# Patient Record
Sex: FEMALE | Race: OTHER | HISPANIC OR LATINO | URBAN - METROPOLITAN AREA
[De-identification: names, ages, dates, MRNs, and addresses within clinical notes are randomized per-mention and may not be internally consistent; named-entity substitution may affect disease eponyms.]

---

## 2024-01-06 ENCOUNTER — INPATIENT (INPATIENT)
Facility: HOSPITAL | Age: 29
LOS: 1 days | Discharge: HOME | End: 2024-01-08
Attending: OBSTETRICS & GYNECOLOGY | Admitting: OBSTETRICS & GYNECOLOGY
Payer: MEDICAID

## 2024-01-06 VITALS — SYSTOLIC BLOOD PRESSURE: 124 MMHG | HEART RATE: 78 BPM | DIASTOLIC BLOOD PRESSURE: 77 MMHG

## 2024-01-06 DIAGNOSIS — O26.899 OTHER SPECIFIED PREGNANCY RELATED CONDITIONS, UNSPECIFIED TRIMESTER: ICD-10-CM

## 2024-01-06 DIAGNOSIS — O26.893 OTHER SPECIFIED PREGNANCY RELATED CONDITIONS, THIRD TRIMESTER: ICD-10-CM

## 2024-01-06 LAB
ABO RH CONFIRMATION: SIGNIFICANT CHANGE UP
ABO RH CONFIRMATION: SIGNIFICANT CHANGE UP
BASOPHILS # BLD AUTO: 0.02 K/UL — SIGNIFICANT CHANGE UP (ref 0–0.2)
BASOPHILS # BLD AUTO: 0.02 K/UL — SIGNIFICANT CHANGE UP (ref 0–0.2)
BASOPHILS NFR BLD AUTO: 0.2 % — SIGNIFICANT CHANGE UP (ref 0–2)
BASOPHILS NFR BLD AUTO: 0.2 % — SIGNIFICANT CHANGE UP (ref 0–2)
BLD GP AB SCN SERPL QL: SIGNIFICANT CHANGE UP
BLD GP AB SCN SERPL QL: SIGNIFICANT CHANGE UP
EOSINOPHIL # BLD AUTO: 0.07 K/UL — SIGNIFICANT CHANGE UP (ref 0–0.5)
EOSINOPHIL # BLD AUTO: 0.07 K/UL — SIGNIFICANT CHANGE UP (ref 0–0.5)
EOSINOPHIL NFR BLD AUTO: 0.8 % — SIGNIFICANT CHANGE UP (ref 0–6)
EOSINOPHIL NFR BLD AUTO: 0.8 % — SIGNIFICANT CHANGE UP (ref 0–6)
HCT VFR BLD CALC: 39.8 % — SIGNIFICANT CHANGE UP (ref 34.5–45)
HCT VFR BLD CALC: 39.8 % — SIGNIFICANT CHANGE UP (ref 34.5–45)
HGB BLD-MCNC: 13.5 G/DL — SIGNIFICANT CHANGE UP (ref 11.5–15.5)
HGB BLD-MCNC: 13.5 G/DL — SIGNIFICANT CHANGE UP (ref 11.5–15.5)
HIV 1 & 2 AB SERPL IA.RAPID: SIGNIFICANT CHANGE UP
HIV 1 & 2 AB SERPL IA.RAPID: SIGNIFICANT CHANGE UP
HIV 1+2 AB+HIV1 P24 AG SERPL QL IA: SIGNIFICANT CHANGE UP
HIV 1+2 AB+HIV1 P24 AG SERPL QL IA: SIGNIFICANT CHANGE UP
IMM GRANULOCYTES NFR BLD AUTO: 0.8 % — SIGNIFICANT CHANGE UP (ref 0–0.9)
IMM GRANULOCYTES NFR BLD AUTO: 0.8 % — SIGNIFICANT CHANGE UP (ref 0–0.9)
LYMPHOCYTES # BLD AUTO: 1.7 K/UL — SIGNIFICANT CHANGE UP (ref 1–3.3)
LYMPHOCYTES # BLD AUTO: 1.7 K/UL — SIGNIFICANT CHANGE UP (ref 1–3.3)
LYMPHOCYTES # BLD AUTO: 18.4 % — SIGNIFICANT CHANGE UP (ref 13–44)
LYMPHOCYTES # BLD AUTO: 18.4 % — SIGNIFICANT CHANGE UP (ref 13–44)
MCHC RBC-ENTMCNC: 28.5 PG — SIGNIFICANT CHANGE UP (ref 27–34)
MCHC RBC-ENTMCNC: 28.5 PG — SIGNIFICANT CHANGE UP (ref 27–34)
MCHC RBC-ENTMCNC: 33.9 GM/DL — SIGNIFICANT CHANGE UP (ref 32–36)
MCHC RBC-ENTMCNC: 33.9 GM/DL — SIGNIFICANT CHANGE UP (ref 32–36)
MCV RBC AUTO: 84 FL — SIGNIFICANT CHANGE UP (ref 80–100)
MCV RBC AUTO: 84 FL — SIGNIFICANT CHANGE UP (ref 80–100)
MONOCYTES # BLD AUTO: 0.44 K/UL — SIGNIFICANT CHANGE UP (ref 0–0.9)
MONOCYTES # BLD AUTO: 0.44 K/UL — SIGNIFICANT CHANGE UP (ref 0–0.9)
MONOCYTES NFR BLD AUTO: 4.8 % — SIGNIFICANT CHANGE UP (ref 2–14)
MONOCYTES NFR BLD AUTO: 4.8 % — SIGNIFICANT CHANGE UP (ref 2–14)
NEUTROPHILS # BLD AUTO: 6.96 K/UL — SIGNIFICANT CHANGE UP (ref 1.8–7.4)
NEUTROPHILS # BLD AUTO: 6.96 K/UL — SIGNIFICANT CHANGE UP (ref 1.8–7.4)
NEUTROPHILS NFR BLD AUTO: 75 % — SIGNIFICANT CHANGE UP (ref 43–77)
NEUTROPHILS NFR BLD AUTO: 75 % — SIGNIFICANT CHANGE UP (ref 43–77)
PLATELET # BLD AUTO: 273 K/UL — SIGNIFICANT CHANGE UP (ref 150–400)
PLATELET # BLD AUTO: 273 K/UL — SIGNIFICANT CHANGE UP (ref 150–400)
RBC # BLD: 4.74 M/UL — SIGNIFICANT CHANGE UP (ref 3.8–5.2)
RBC # BLD: 4.74 M/UL — SIGNIFICANT CHANGE UP (ref 3.8–5.2)
RBC # FLD: 14.8 % — HIGH (ref 10.3–14.5)
RBC # FLD: 14.8 % — HIGH (ref 10.3–14.5)
WBC # BLD: 9.26 K/UL — SIGNIFICANT CHANGE UP (ref 3.8–10.5)
WBC # BLD: 9.26 K/UL — SIGNIFICANT CHANGE UP (ref 3.8–10.5)
WBC # FLD AUTO: 9.26 K/UL — SIGNIFICANT CHANGE UP (ref 3.8–10.5)
WBC # FLD AUTO: 9.26 K/UL — SIGNIFICANT CHANGE UP (ref 3.8–10.5)

## 2024-01-06 PROCEDURE — 59409 OBSTETRICAL CARE: CPT | Mod: U9

## 2024-01-06 RX ORDER — CHLORHEXIDINE GLUCONATE 213 G/1000ML
1 SOLUTION TOPICAL DAILY
Refills: 0 | Status: DISCONTINUED | OUTPATIENT
Start: 2024-01-06 | End: 2024-01-06

## 2024-01-06 RX ORDER — HYDROCORTISONE 1 %
1 OINTMENT (GRAM) TOPICAL EVERY 6 HOURS
Refills: 0 | Status: DISCONTINUED | OUTPATIENT
Start: 2024-01-06 | End: 2024-01-08

## 2024-01-06 RX ORDER — CITRIC ACID/SODIUM CITRATE 300-500 MG
30 SOLUTION, ORAL ORAL ONCE
Refills: 0 | Status: DISCONTINUED | OUTPATIENT
Start: 2024-01-06 | End: 2024-01-06

## 2024-01-06 RX ORDER — OXYTOCIN 10 UNIT/ML
333.33 VIAL (ML) INJECTION
Qty: 20 | Refills: 0 | Status: DISCONTINUED | OUTPATIENT
Start: 2024-01-06 | End: 2024-01-06

## 2024-01-06 RX ORDER — ACETAMINOPHEN 500 MG
3 TABLET ORAL
Qty: 60 | Refills: 0
Start: 2024-01-06 | End: 2024-01-10

## 2024-01-06 RX ORDER — DIBUCAINE 1 %
1 OINTMENT (GRAM) RECTAL EVERY 6 HOURS
Refills: 0 | Status: DISCONTINUED | OUTPATIENT
Start: 2024-01-06 | End: 2024-01-08

## 2024-01-06 RX ORDER — SODIUM CHLORIDE 9 MG/ML
3 INJECTION INTRAMUSCULAR; INTRAVENOUS; SUBCUTANEOUS EVERY 8 HOURS
Refills: 0 | Status: DISCONTINUED | OUTPATIENT
Start: 2024-01-06 | End: 2024-01-08

## 2024-01-06 RX ORDER — AER TRAVELER 0.5 G/1
1 SOLUTION RECTAL; TOPICAL EVERY 4 HOURS
Refills: 0 | Status: DISCONTINUED | OUTPATIENT
Start: 2024-01-06 | End: 2024-01-08

## 2024-01-06 RX ORDER — SODIUM CHLORIDE 9 MG/ML
1000 INJECTION, SOLUTION INTRAVENOUS
Refills: 0 | Status: DISCONTINUED | OUTPATIENT
Start: 2024-01-06 | End: 2024-01-06

## 2024-01-06 RX ORDER — OXYCODONE HYDROCHLORIDE 5 MG/1
5 TABLET ORAL EVERY 6 HOURS
Refills: 0 | Status: DISCONTINUED | OUTPATIENT
Start: 2024-01-06 | End: 2024-01-08

## 2024-01-06 RX ORDER — BENZOCAINE 10 %
1 GEL (GRAM) MUCOUS MEMBRANE EVERY 6 HOURS
Refills: 0 | Status: DISCONTINUED | OUTPATIENT
Start: 2024-01-06 | End: 2024-01-08

## 2024-01-06 RX ORDER — SIMETHICONE 80 MG/1
80 TABLET, CHEWABLE ORAL EVERY 4 HOURS
Refills: 0 | Status: DISCONTINUED | OUTPATIENT
Start: 2024-01-06 | End: 2024-01-08

## 2024-01-06 RX ORDER — SODIUM CHLORIDE 9 MG/ML
1000 INJECTION, SOLUTION INTRAVENOUS
Refills: 0 | Status: DISCONTINUED | OUTPATIENT
Start: 2024-01-06 | End: 2024-01-08

## 2024-01-06 RX ORDER — DIPHENHYDRAMINE HCL 50 MG
25 CAPSULE ORAL EVERY 6 HOURS
Refills: 0 | Status: DISCONTINUED | OUTPATIENT
Start: 2024-01-06 | End: 2024-01-08

## 2024-01-06 RX ORDER — ACETAMINOPHEN 500 MG
975 TABLET ORAL
Refills: 0 | Status: DISCONTINUED | OUTPATIENT
Start: 2024-01-06 | End: 2024-01-08

## 2024-01-06 RX ORDER — OXYTOCIN 10 UNIT/ML
41.67 VIAL (ML) INJECTION
Qty: 20 | Refills: 0 | Status: DISCONTINUED | OUTPATIENT
Start: 2024-01-06 | End: 2024-01-08

## 2024-01-06 RX ORDER — PRAMOXINE HYDROCHLORIDE 150 MG/15G
1 AEROSOL, FOAM RECTAL EVERY 4 HOURS
Refills: 0 | Status: DISCONTINUED | OUTPATIENT
Start: 2024-01-06 | End: 2024-01-08

## 2024-01-06 RX ORDER — LANOLIN
1 OINTMENT (GRAM) TOPICAL EVERY 6 HOURS
Refills: 0 | Status: DISCONTINUED | OUTPATIENT
Start: 2024-01-06 | End: 2024-01-08

## 2024-01-06 RX ORDER — TETANUS TOXOID, REDUCED DIPHTHERIA TOXOID AND ACELLULAR PERTUSSIS VACCINE, ADSORBED 5; 2.5; 8; 8; 2.5 [IU]/.5ML; [IU]/.5ML; UG/.5ML; UG/.5ML; UG/.5ML
0.5 SUSPENSION INTRAMUSCULAR ONCE
Refills: 0 | Status: DISCONTINUED | OUTPATIENT
Start: 2024-01-06 | End: 2024-01-08

## 2024-01-06 RX ORDER — MAGNESIUM HYDROXIDE 400 MG/1
30 TABLET, CHEWABLE ORAL
Refills: 0 | Status: DISCONTINUED | OUTPATIENT
Start: 2024-01-06 | End: 2024-01-08

## 2024-01-06 RX ADMIN — SODIUM CHLORIDE 125 MILLILITER(S): 9 INJECTION, SOLUTION INTRAVENOUS at 18:47

## 2024-01-06 RX ADMIN — Medication 975 MILLIGRAM(S): at 21:35

## 2024-01-06 RX ADMIN — SODIUM CHLORIDE 125 MILLILITER(S): 9 INJECTION, SOLUTION INTRAVENOUS at 17:20

## 2024-01-06 RX ADMIN — OXYCODONE HYDROCHLORIDE 5 MILLIGRAM(S): 5 TABLET ORAL at 19:44

## 2024-01-06 NOTE — OB RN TRIAGE NOTE - HOW OFTEN DO YOU HAVE A DRINK CONTAINING ALCOHOL?
Quality 226: Preventive Care And Screening: Tobacco Use: Screening And Cessation Intervention: Patient screened for tobacco use and is an ex/non-smoker Quality 130: Documentation Of Current Medications In The Medical Record: Current Medications Documented Detail Level: Detailed Quality 431: Preventive Care And Screening: Unhealthy Alcohol Use - Screening: Patient screened for unhealthy alcohol use using a single question and scores less than 2 times per year Never

## 2024-01-06 NOTE — DISCHARGE NOTE OB - MATERIALS PROVIDED
Vaccinations/[facility]  Screening Program/  Immunization Record/Breastfeeding Log/Bottle Feeding Log/Breastfeeding Mother’s Support Group Information/Guide to Postpartum Care/[facility] Hearing Screen Program/Back To Sleep Handout/Shaken Baby Prevention Handout/Breastfeeding Guide and Packet/Birth Certificate Instructions/Discharge Medication Information for Patients and Families Pocket Guide/MMR Vaccination (VIS Pub Date: 2012) Vaccinations/[facility]  Screening Program/  Immunization Record/Breastfeeding Log/Bottle Feeding Log/Breastfeeding Mother’s Support Group Information/Guide to Postpartum Care/[facility] Hearing Screen Program/Back To Sleep Handout/Shaken Baby Prevention Handout/Breastfeeding Guide and Packet/Birth Certificate Instructions/Discharge Medication Information for Patients and Families Pocket Guide/MMR Vaccination (VIS Pub Date: 2012)

## 2024-01-06 NOTE — OB PROVIDER H&P - PRO PRENATAL LABS ORI SOURCE HBSAG
MRI needed an up to date order for MRI C-Spine.  Pt has an appt on Friday 7/15/22.  This order was from back in May.  The pt initialaly canceled MRI but then decided to go ahead and have it done.  See 5/23/22 OV note. Order updated and referrals states no Pre-Cert is needed.   hard copy, drawn during this pregnancy

## 2024-01-06 NOTE — OB PROVIDER LABOR PROGRESS NOTE - ASSESSMENT
non reducible anterior lip, will continue to labor patient expectantly  Reexam as patient feels constant pressure

## 2024-01-06 NOTE — OB PROVIDER DELIVERY SUMMARY - NSSELHIDDEN_OBGYN_ALL_OB_FT
[NS_DeliveryAttending1_OBGYN_ALL_OB_FT:[id number]=],[NS_DeliveryAssist1_OBGYN_ALL_OB_FT:[email or web address]=],[NS_DeliveryAssist2_OBGYN_ALL_OB_FT:[id number]=] [NS_DeliveryAttending1_OBGYN_ALL_OB_FT:[id number]=],[NS_DeliveryAssist1_OBGYN_ALL_OB_FT:[email or web address]=],[NS_DeliveryAssist2_OBGYN_ALL_OB_FT:[id number]=]

## 2024-01-06 NOTE — OB PROVIDER H&P - PRO PRENATAL LABS ORI SOURCE RUBELLA
----- Message from Natalie Tompkins PA-C sent at 8/30/2018  8:48 PM CDT -----  No diabetes hemoglobin A1c is normal.  Lab results showed low Vitamin D. Advise RX vitamin D 50,000 IU once weekly for 12 weeks. Recheck vitamin D level in 3 months.   After la
An order was placed for Vitamin D to be done in 3 months and prescription for Vitamin D was sent to the patient's pharmacy.
hard copy, drawn during this pregnancy

## 2024-01-06 NOTE — OB RN PATIENT PROFILE - PRESSURE ULCER(S)
Talked to RN  [phone] from Home facility where patient is resides. Gave instructions to send the BP  report for 1 week (AM AND PM ). She is informed that DR. MCCOLLUM increased midodrine to 15 mg Tid.   no

## 2024-01-06 NOTE — OB RN DELIVERY SUMMARY - NSSELHIDDEN_OBGYN_ALL_OB_FT
[NS_DeliveryAttending1_OBGYN_ALL_OB_FT:[id number]=] [NS_DeliveryAttending1_OBGYN_ALL_OB_FT:[id number]=] [NS_DeliveryAttending1_OBGYN_ALL_OB_FT:[id number]=],[NS_DeliveryAssist1_OBGYN_ALL_OB_FT:[email or web address]=],[NS_DeliveryRN_OBGYN_ALL_OB_FT:[id number]=] [NS_DeliveryAttending1_OBGYN_ALL_OB_FT:[id number]=],[NS_DeliveryAssist1_OBGYN_ALL_OB_FT:[email or web address]=],[NS_DeliveryRN_OBGYN_ALL_OB_FT:[id number]=]

## 2024-01-06 NOTE — OB PROVIDER H&P - ASSESSMENT
A/P: 28y  at 39w6d GA by LMP who presents to L&D for labor at term, contractions every 4-5 minutes. patient receives care in [place], will keep her for delivery  -Admit to L&D  -Consent  -Admission labs  -NPO, except ice chips   -IV fluids  -Labor: Intact Latent labor. [place] q4-5 min.   -Fetus: Cat I tracing. Continuous toco and fetal monitoring.   -GBS: Unknown, no GBS ppx required   -Analgesia: PRN  -DVT ppx: Ambulate and SCD's while in bed   -Expectant management  -PP tubal  -F/u PNL    Discussed with Dr. Villagran A/P: 28y  at 39w6d GA by LMP who presents to L&D for labor at term, contractions every 4-5 minutes. patient receives care in [place], will keep her for delivery  -Admit to L&D  -Consent  -Admission labs  -NPO, except ice chips   -IV fluids  -Labor: Intact Latent labor. [place] q4-5 min.   -Fetus: Cat I tracing. Continuous toco and fetal monitoring.   -GBS: Unknown, no GBS ppx required   -Analgesia: PRN  -DVT ppx: Ambulate and SCD's while in bed   -Expectant management  -PP tubal  -F/u PNL    Discussed with Dr. Villagran

## 2024-01-06 NOTE — DISCHARGE NOTE OB - PROVIDER TOKENS
FREE:[LAST:[[facility]],PHONE:[phone]],FAX:[(   )    -]] FREE:[LAST:[[facility]],PHONE:[phone]],FAX:[(   )    -]]

## 2024-01-06 NOTE — DISCHARGE NOTE OB - PATIENT PORTAL LINK FT
You can access the FollowMyHealth Patient Portal offered by [facility] by registering at the following website: [email or web address]. By joining [employer]’s FollowMyHealth portal, you will also be able to view your health information using other applications (apps) compatible with our system. You can access the FollowMyHealth Patient Portal offered by [facility] by registering at the following website: [email or web address]. By joining [email or web address]’s FollowMyHealth portal, you will also be able to view your health information using other applications (apps) compatible with our system.

## 2024-01-06 NOTE — DISCHARGE NOTE OB - MEDICATION SUMMARY - MEDICATIONS TO TAKE
I will START or STAY ON the medications listed below when I get home from the hospital:    Tylenol 325 mg oral tablet  -- 3 tab(s) by mouth every 6 hours as needed for  pain  -- Indication: For pain

## 2024-01-06 NOTE — DISCHARGE NOTE OB - NS MD DC FALL RISK RISK
For information on Fall & Injury Prevention, visit: [email or web address] OR  [email or web address] OR  https://www.cdc.gov/steadi/patient.html For information on Fall & Injury Prevention, visit: [email or web address] OR  [email or web address] OR  https://www.cdc.gov/steadi/patient.html

## 2024-01-06 NOTE — DISCHARGE NOTE OB - CARE PROVIDER_API CALL
[facility],   Phone: [phone]  Fax: (   )    -  Follow Up Time:    [facility],   Phone: [phone]  Fax: (   )    -  Follow Up Time:

## 2024-01-06 NOTE — OB PROVIDER H&P - HISTORY OF PRESENT ILLNESS
28y  at 39w6d GA by LMP who presents to L&D for labor at term, contractions every 4-5 minutes.   NGUYEN: 2024  LMP:  4/15/2023    Prenatal course uncomplicated.      Patient denies vaginal bleeding, contractions and leakage of fluid. She endorses good fetal movement. Denies fevers, chills, nausea and vomiting. No other complaints at this time.     POB: G1  36wk; G2  38wk uncomplicated  PGYN: -fibroids, -ovarian cysts, denies STD hx, denies abnormal PAPs   PMH: Denies  PSH: Denies  SH: Denies EtOH, tobacco and illicit drug use during this pregnancy; feels safe at home   Meds: PNVs  Allergies: NKDA

## 2024-01-06 NOTE — OB PROVIDER DELIVERY SUMMARY - NSPROVIDERDELIVERYNOTE_OBGYN_ALL_OB_FT
Vaginal Delivery Summary    Procedure: Normal spontaneous vaginal delivery   Findings: Viable male infant delivered in cephalic presentation at 1743, placenta delivered at 1745.  Apgar scores 9/9.   Weight will be recorded after 1 hour to allow for skin-to-skin contact.  Laceration(s): none  Repair: none  EBL: 105cc  Complications: nuchal x1    Procedure:   Patient felt rectal pressure and was found to be fully dilated, +2 station. She pushed effectively. She delivered a viable male infant. A loose nuchal cord X 1 was reduced on delivery and delayed cord clamping was performed for 60 seconds. Placenta delivered intact and pitocin was started at the delivery of baby. Perineum and vagina were inspected, no laceration present and repaired. Adequate hemostasis was obtained. Fundus was noted to be firm.

## 2024-01-06 NOTE — OB RN DELIVERY SUMMARY - NS_SEPSISRSKCALC_OBGYN_ALL_OB_FT
EOS calculated successfully. EOS Risk Factor: 0.5/1000 live births ([facility] national incidence); GA=39w6d; Temp=98.8; ROM=0.633; GBS='Unknown'; Antibiotics='No antibiotics or any antibiotics < 2 hrs prior to birth'   EOS calculated successfully. EOS Risk Factor: 0.5/1000 live births ([facility] national incidence); GA=39w6d; Temp=98.8; ROM=0.633; GBS='Unknown'; Antibiotics='No antibiotics or any antibiotics < 2 hrs prior to birth'

## 2024-01-06 NOTE — OB RN PATIENT PROFILE - FALL HARM RISK - UNIVERSAL INTERVENTIONS
Bed in lowest position, wheels locked, appropriate side rails in place/Call bell, personal items and telephone in reach/Instruct patient to call for assistance before getting out of bed or chair/Non-slip footwear when patient is out of bed/[place] to call system/Physically safe environment - no spills, clutter or unnecessary equipment/Purposeful Proactive Rounding/Room/bathroom lighting operational, light cord in reach Bed in lowest position, wheels locked, appropriate side rails in place/Call bell, personal items and telephone in reach/Instruct patient to call for assistance before getting out of bed or chair/Non-slip footwear when patient is out of bed/[place] to call system/Physically safe environment - no spills, clutter or unnecessary equipment/Purposeful Proactive Rounding/Room/bathroom lighting operational, light cord in reach

## 2024-01-06 NOTE — OB PROVIDER H&P - ATTENDING COMMENTS
28y  at 39w6d GA by LMP who presents to L&D for labor at term  - consenting included following discussions:   1. patient informed of reason for admission: labor   2. modes and options of induction (prostaglandins, balloon, artificial rupture of membranes, and membrane sweeping) -- all explained in terms of mechanism of action/risks/benefits/alternatives  3. discussed antibiotic coverage for either GBS or in case of concern for chorioamnionitis   4. pain medication options/use/indications discussed, including Stadol IV vs Epidural (neuraxial analgesia)   5. discussed management of labor with pitocin if inadequate contraction pattern/no change  6. discussed expectations for vaginal delivery and indications for  (fetal distress, failed induction, arrest)  7. discussed blood transfusion in case of emergency, patient agreeable   8. discussed all possible procedural interventions including (AROM, internal fetal monitoring, instrumental/vaccuum assisted delivery)   9. discussed typical intrapartum/postpartum medication interventions including uterotonics, antihypertensives, anti-emetics, and pain control medications   Patient verbalized understanding of above points and agreement with above plan, consent signed.     - admission labs   - FHT: reactive and reassuring   - [place]: with irregular activity   - vertex   - GBS unknown, term status, no indication for abx ppx   - mild discomfort, does not currently desire analgesia   - continuous EFM/[place] until delivery   - vaginal delivery anticipated, expectant management 28y  at 39w6d GA by LMP who presents to L&D for labor at term  - consenting included following discussions:   1. patient informed of reason for admission: labor   2. modes and options of induction (prostaglandins, balloon, artificial rupture of membranes, and membrane sweeping) -- all explained in terms of mechanism of action/risks/benefits/alternatives  3. discussed antibiotic coverage for either GBS or in case of concern for chorioamnionitis   4. pain medication options/use/indications discussed, including Stadol IV vs Epidural (neuraxial analgesia)   5. discussed management of labor with pitocin if inadequate contraction pattern/no change  6. discussed expectations for vaginal delivery and indications for  (fetal distress, failed induction, arrest)  7. discussed blood transfusion in case of emergency, patient agreeable   8. discussed all possible procedural interventions including (AROM, internal fetal monitoring, instrumental/vaccuum assisted delivery)   9. discussed typical intrapartum/postpartum medication interventions including uterotonics, antihypertensives, anti-emetics, and pain control medications   Patient verbalized understanding of above points and agreement with above plan, consent signed.     - admission labs   - FHT: reactive and reassuring   - [place]: with irregular activity   - vertex   - GBS unknown, term status, no indication for abx ppx   - mild discomfort, does not currently desire analgesia   - continuous EFM/[place] until delivery   - vaginal delivery anticipated, expectant management

## 2024-01-06 NOTE — OB PROVIDER H&P - NSHPPHYSICALEXAM_GEN_ALL_CORE
T(C): 36.8 (01-06-24 @ 15:37), Max: 36.8 (01-06-24 @ 15:37)  HR: 78 (01-06-24 @ 15:37) (78 - 78)  BP: 124/77 (01-06-24 @ 15:37) (124/77 - 124/77)  RR: 18 (01-06-24 @ 15:37) (18 - 18)  SpO2: --  Gen: NAD, well-appearing   Abd: Soft, gravid  Ext: non-tender, non-edematous  SVE:  4/80/-2  Bedside sono: vertex  FHT: 140 baseline, moderate variability, + accels, -decels  [place]: q4-5 min T(C): 36.8 (01-06-24 @ 15:37), Max: 36.8 (01-06-24 @ 15:37)  HR: 78 (01-06-24 @ 15:37) (78 - 78)  BP: 124/77 (01-06-24 @ 15:37) (124/77 - 124/77)  RR: 18 (01-06-24 @ 15:37) (18 - 18)  SpO2: --  Gen: NAD, well-appearing   Abd: Soft, gravid  Ext: non-tender, non-edematous  SVE:  4/80/-2  Bedside sono: vertex  FHT: 140 baseline, moderate variability, + accels, -decels  [place]: q4-5 min

## 2024-01-06 NOTE — OB RN TRIAGE NOTE - FALL HARM RISK - UNIVERSAL INTERVENTIONS
Bed in lowest position, wheels locked, appropriate side rails in place/Call bell, personal items and telephone in reach/Instruct patient to call for assistance before getting out of bed or chair/Non-slip footwear when patient is out of bed/[place] to call system/Physically safe environment - no spills, clutter or unnecessary equipment/Purposeful Proactive Rounding/Room/bathroom lighting operational, light cord in reach Bed in lowest position, wheels locked, appropriate side rails in place/Call bell, personal items and telephone in reach/Instruct patient to call for assistance before getting out of bed or chair/Non-slip footwear when patient is out of bed/[place] to call system/Physically safe environment - no spills, clutter or unnecessary equipment/Purposeful Proactive Rounding/Room/bathroom lighting operational, light cord in reach

## 2024-01-07 LAB
HBV SURFACE AG SERPL QL IA: SIGNIFICANT CHANGE UP
HBV SURFACE AG SERPL QL IA: SIGNIFICANT CHANGE UP
HCT VFR BLD CALC: 36.8 % — SIGNIFICANT CHANGE UP (ref 34.5–45)
HCT VFR BLD CALC: 36.8 % — SIGNIFICANT CHANGE UP (ref 34.5–45)
HGB BLD-MCNC: 11.6 G/DL — SIGNIFICANT CHANGE UP (ref 11.5–15.5)
HGB BLD-MCNC: 11.6 G/DL — SIGNIFICANT CHANGE UP (ref 11.5–15.5)
MCHC RBC-ENTMCNC: 27.4 PG — SIGNIFICANT CHANGE UP (ref 27–34)
MCHC RBC-ENTMCNC: 27.4 PG — SIGNIFICANT CHANGE UP (ref 27–34)
MCHC RBC-ENTMCNC: 31.5 GM/DL — LOW (ref 32–36)
MCHC RBC-ENTMCNC: 31.5 GM/DL — LOW (ref 32–36)
MCV RBC AUTO: 87 FL — SIGNIFICANT CHANGE UP (ref 80–100)
MCV RBC AUTO: 87 FL — SIGNIFICANT CHANGE UP (ref 80–100)
PLATELET # BLD AUTO: 254 K/UL — SIGNIFICANT CHANGE UP (ref 150–400)
PLATELET # BLD AUTO: 254 K/UL — SIGNIFICANT CHANGE UP (ref 150–400)
RBC # BLD: 4.23 M/UL — SIGNIFICANT CHANGE UP (ref 3.8–5.2)
RBC # BLD: 4.23 M/UL — SIGNIFICANT CHANGE UP (ref 3.8–5.2)
RBC # FLD: 14.8 % — HIGH (ref 10.3–14.5)
RBC # FLD: 14.8 % — HIGH (ref 10.3–14.5)
T PALLIDUM AB TITR SER: NEGATIVE — SIGNIFICANT CHANGE UP
T PALLIDUM AB TITR SER: NEGATIVE — SIGNIFICANT CHANGE UP
WBC # BLD: 10.48 K/UL — SIGNIFICANT CHANGE UP (ref 3.8–10.5)
WBC # BLD: 10.48 K/UL — SIGNIFICANT CHANGE UP (ref 3.8–10.5)
WBC # FLD AUTO: 10.48 K/UL — SIGNIFICANT CHANGE UP (ref 3.8–10.5)
WBC # FLD AUTO: 10.48 K/UL — SIGNIFICANT CHANGE UP (ref 3.8–10.5)

## 2024-01-07 RX ORDER — MEDROXYPROGESTERONE ACETATE 150 MG/ML
150 INJECTION, SUSPENSION, EXTENDED RELEASE INTRAMUSCULAR ONCE
Refills: 0 | Status: COMPLETED | OUTPATIENT
Start: 2024-01-07 | End: 2024-01-07

## 2024-01-07 RX ADMIN — Medication 975 MILLIGRAM(S): at 09:30

## 2024-01-07 RX ADMIN — Medication 1 TABLET(S): at 12:51

## 2024-01-07 RX ADMIN — MEDROXYPROGESTERONE ACETATE 150 MILLIGRAM(S): 150 INJECTION, SUSPENSION, EXTENDED RELEASE INTRAMUSCULAR at 13:21

## 2024-01-07 RX ADMIN — Medication 975 MILLIGRAM(S): at 15:19

## 2024-01-07 NOTE — PRE-OP CHECKLIST - IDENTIFICATION BAND VERIFIED
none known none known none known done none known none known none known none known none known none known none known none known none known none known

## 2024-01-07 NOTE — PROGRESS NOTE ADULT - ATTENDING COMMENTS
28y  now stable PPD#1 s/p spontaneous vaginal delivery at 39w6d gestation, uncomplicated. Patient desires PP tubal, has signed Medicaid consent, will assess OR availability. Patient had Nexplanon in past, prefers Depo if tubal cannot be done. Discharge instructions reviewed.

## 2024-01-08 VITALS
HEART RATE: 84 BPM | DIASTOLIC BLOOD PRESSURE: 70 MMHG | TEMPERATURE: 98 F | OXYGEN SATURATION: 99 % | RESPIRATION RATE: 17 BRPM | SYSTOLIC BLOOD PRESSURE: 107 MMHG

## 2024-01-08 LAB
MEV IGG SER-ACNC: 6 AU/ML — SIGNIFICANT CHANGE UP
MEV IGG SER-ACNC: 6 AU/ML — SIGNIFICANT CHANGE UP
MEV IGG+IGM SER-IMP: NEGATIVE — SIGNIFICANT CHANGE UP
MEV IGG+IGM SER-IMP: NEGATIVE — SIGNIFICANT CHANGE UP
RUBV IGG SER-ACNC: 0.5 INDEX — SIGNIFICANT CHANGE UP
RUBV IGG SER-ACNC: 0.5 INDEX — SIGNIFICANT CHANGE UP
RUBV IGG SER-IMP: NEGATIVE — SIGNIFICANT CHANGE UP
RUBV IGG SER-IMP: NEGATIVE — SIGNIFICANT CHANGE UP

## 2024-01-08 PROCEDURE — 87340 HEPATITIS B SURFACE AG IA: CPT

## 2024-01-08 PROCEDURE — 59050 FETAL MONITOR W/REPORT: CPT

## 2024-01-08 PROCEDURE — 86780 TREPONEMA PALLIDUM: CPT

## 2024-01-08 PROCEDURE — 87389 HIV-1 AG W/HIV-1&-2 AB AG IA: CPT

## 2024-01-08 PROCEDURE — 85025 COMPLETE CBC W/AUTO DIFF WBC: CPT

## 2024-01-08 PROCEDURE — 86900 BLOOD TYPING SEROLOGIC ABO: CPT

## 2024-01-08 PROCEDURE — 86850 RBC ANTIBODY SCREEN: CPT

## 2024-01-08 PROCEDURE — 85027 COMPLETE CBC AUTOMATED: CPT

## 2024-01-08 PROCEDURE — 86765 RUBEOLA ANTIBODY: CPT

## 2024-01-08 PROCEDURE — 90707 MMR VACCINE SC: CPT

## 2024-01-08 PROCEDURE — 86901 BLOOD TYPING SEROLOGIC RH(D): CPT

## 2024-01-08 PROCEDURE — 86762 RUBELLA ANTIBODY: CPT

## 2024-01-08 PROCEDURE — 36415 COLL VENOUS BLD VENIPUNCTURE: CPT

## 2024-01-08 PROCEDURE — 86703 HIV-1/HIV-2 1 RESULT ANTBDY: CPT

## 2024-01-08 RX ADMIN — Medication 0.5 MILLILITER(S): at 08:33

## 2024-01-08 RX ADMIN — Medication 975 MILLIGRAM(S): at 02:23

## 2024-01-08 RX ADMIN — Medication 975 MILLIGRAM(S): at 08:32

## 2024-01-08 NOTE — PROGRESS NOTE ADULT - ATTENDING COMMENTS
Agree with above assessment and plan.  Pt  is a 28y  now PPD#1 s/p spontaneous vaginal delivery at 39w6d gestation, uncomplicated  She feels well and has no complaints today  Her pain is well controlled and she remained afebrile.  Will continue routine PP care  Pt will f/u with her primary OB provider at [facility] for her PP care.  Discharge planning.    GENEVA Fisher Agree with above assessment and plan.  Pt  is a 28y  now PPD#1 s/p spontaneous vaginal delivery at 39w6d gestation, uncomplicated  She feels well and has no complaints today  Her pain is well controlled and she remained afebrile.  Will continue routine PP care  Pt will f/u with her primary OB provider at [facility] for her PP care.  Discharge planning.    GENEVA Fisher

## 2024-01-08 NOTE — PROGRESS NOTE ADULT - ASSESSMENT
A/P:   28y  now PPD#2 s/p spontaneous vaginal delivery at 39w6d gestation, uncomplicated.   -Vital signs stable  -Hgb: 13.5 -> 11.6  -Voiding, tolerating PO, bowel function nml   -Advance care as tolerated   -Continue routine postpartum care and education  -Healthy male infant, declines circumcision  -Depo for PPBC  -Dispo: Pt is stable for discharge to home after tubal pending attending approval.
A/P:   28y  now PPD#1 s/p spontaneous vaginal delivery at 39w6d gestation, uncomplicated. patient desires PP tubal  -Vital signs stable  -Hgb: 13.5 -> AM labs pending   -Voiding, tolerating PO, bowel function nml   -Advance care as tolerated   -Continue routine postpartum care and education  -Healthy male infant, declines circumcision  -NPO for tubal today  -Dispo: Pt is stable for discharge to home after tubal pending attending approval.

## 2024-01-08 NOTE — PROGRESS NOTE ADULT - SUBJECTIVE AND OBJECTIVE BOX
FLOYD JOHNSON is a 28y  now PPD#1 s/p spontaneous vaginal delivery at 39w6d gestation, uncomplicated. patient desires PP tubal    S:    The patient has no complaints.  Pain controlled with current treatment regimen.   She is ambulating without difficulty and tolerating PO.   + flatus/-BM/+ voiding   She endorses appropriate lochia, which is decreasing.   She is breastfeeding without difficulty.   Denies HA, CP, SOB, leg pain    O:    T(C): 36.8 (24 @ 04:27), Max: 37.1 (24 @ 16:27)  HR: 65 (24 @ 04:27) (63 - 88)  BP: 101/61 (24 @ 04:27) (101/61 - 127/59)  RR: 18 (24 @ 04:27) (16 - 20)  SpO2: 97% (24 @ 04:27) (97% - 97%)    Gen: NAD, AOx3  Breast: Nontender, non-engorged   Abdomen:  Soft, non-tender, non-distended  Uterus:  Fundus firm below umbilicus  VE:  +Lochia  Ext:  Non-tender and non-edematous                          13.5   9.26  )-----------( 273      ( 2024 17:20 )             39.8               
FLOYD JOHNSON is a 28y  now PPD#1 s/p spontaneous vaginal delivery at 39w6d gestation, uncomplicated.   S:    The patient has no complaints.  Pain controlled with current treatment regimen.   She is ambulating without difficulty and tolerating PO.   + flatus/-BM/+ voiding   She endorses appropriate lochia, which is decreasing.   She is breastfeeding without difficulty.   Denies HA, CP, SOB, leg pain    O:    Vital Signs Last 24 Hrs  T(C): 36.6 (2024 04:37), Max: 36.9 (2024 07:50)  T(F): 97.8 (2024 04:37), Max: 98.4 (2024 07:50)  HR: 84 (2024 04:37) (72 - 84)  BP: 107/70 (2024 04:37) (107/70 - 119/77)  BP(mean): --  RR: 17 (2024 04:37) (17 - 18)  SpO2: 99% (2024 04:37) (97% - 99%)      Gen: NAD, AOx3  Breast: Nontender, non-engorged   Abdomen:  Soft, non-tender, non-distended  Uterus:  Fundus firm below umbilicus  VE:  +Lochia  Ext:  Non-tender and non-edematous                          13.5   9.26  )-----------( 273      ( 2024 17:20 )             39.8                           11.6   10.48 )-----------( 254      ( 2024 05:28 )             36.8

## 2024-01-10 NOTE — OB RN PATIENT PROFILE - NS PRO DEPRESSION SCREENING Y/N1
GENERAL ANESTHESIA/IV SEDATION/SPINAL POST SURGERY INSTRUCTIONS    1. Rest at home (not necessarily in bed) for 24 hours following anesthesia. You may feel sleepy for the next 24 hours. Do not drive an automobile, operate heavy machinery, or make important decisions.    2. Your diet should start with clear liquids increasing to a light diet on the day of surgery. You may then resume your normal diet. Do not drink alcoholic beverages for 24 hours. If nausea occurs, limit diet to clear liquids (soda, tea, broth, Jell-O). If nausea continues for more than 12 hours, call your doctor.    3. The doctor prescribed [ Tramadol ] for pain. Take as directed. If nothing was prescribed, you may take a non-prescription non- aspirin containing pain medicine such as Tylenol, Advil, etc. If pain is not relieved, call your doctor.    Tramadol is a narcotic and may make you sleepy. Do not drive, drink alcohol, or do anything that requires your full attention while taking it. Take this medication with food to avoid an upset stomach. Tramadol may constipate you, please take Colace/an over the counter stool softener to prevent constipation.     4. Call your doctor if there is excessive:   A. Bleeding or drainage   B. Fever-10 1°F or higher   C. Swelling or redness    5. We strongly recommend a responsible adult to be with you for 24 hours following surgery. This recommendation is for your protection and safety.    6. Avoid smoking for 24 hours following general anesthesia.    7. Drink plenty of fluids. If you are unable to urinate by 3:30PM or a feeling of pressure occurs, call your surgeon and or go to the nearest emergency center.    8. If you any questions please call your Surgeon. If you cannot reach your doctor, call [facility] Emergency Department at [phone].    9. Other instructions:    Hand Surgery Post-Operative Instructions     PRESCRIPTION MEDICATIONS   Ultram (Tramadol):  This is a narcotic  medication for pain.   This medication is to be taken AS NEEDED.   Plan to stay on a scheduled dose of 1-2 tablets every 4-6 hrs for the first 1-3 days.   After 1-3 days you should be able to space out or discontinue the medication and transition to Acetaminophen (Tylenol).  DO NOT EXCEED 400 mg OF THIS MEDICATION IN A 24 HOUR PERIOD.  Do not drive, drink alcohol, or make important decisions WHILE taking this medication.     WOUND CARE  Keep your dressings clean and dry. DO NOT REMOVE THEM.  If you have a splint:  Do not remove the splint.  If the splint feels too tight, call your surgeon immediately and they will give you further instructions.   Do not stick anything in your splint if it gets itchy.   You may shower, but do not get your bandages or splint wet.  Cover the splint or bandage with a plastic bag and tape over the loose end.  If it gets wet accidentally, call your surgeon immediately.   Do not try to dry with a hair dryer.   Your stitches/staples will be removed at your first post op visit and if you have a splint, it will be changed.     WEIGHT BEARING AND ACTIVITY  You will be non weight bearing. This means that you may not put weight onto your operative extremity.   Do not lift anything heavier than a cell phone.   You may do your activities of daily living as you can tolerate.   Elevate your operative extremity while you are at rest to help limit swelling.  It is OK to move your exposed fingers, but try to avoid using the extremity to limit swelling.     THERAPY  Your surgeon will instruct you on when to start your therapy, if you need it, at your first post op visit.  Choose a therapy clinic close to your home so you can be compliant with your program.  Please bring your referral for therapy to your first appointment.     ICE PACK  Use it as much as you can for the first 72 hours.   Try to use it 4-5 times per day after the first 72 hours for the first two weeks after surgery, then use it as  needed.  Elevate your operative extremity above the level of your heart while using the ice for optimal results.     FOLLOW UP  You will need to follow up in the clinic in 7-10 days.   Please call the office to make an appointment (contact information below) as soon as possible after your surgery date.     WHEN SHOULD YOU CONTACT THE OFFICE?  If you have a fever >100.4 degrees F.  If you develop chills or sweats.   If you have pus, significant pain, significant tenderness, or redness surrounding the incision site.  If you have numbness, tingling, or weakness on your affected extremity that was not present before surgery.  If you are unable to urinate >1-2 days after surgery.     IMPORTANT CONTACT INFORMATION  Office Phone Number: [phone]            Want to recognize a nurse?  The [employer] Award® is a way to recognize nurses who provided an outstanding level of care to you during your visit.   Submit a nomination using any method below.     [email or web address]   no

## 2024-10-16 NOTE — OB RN PATIENT PROFILE - NS_ACCOMPAINEDBY_OBGYN_ALL_OB
Patient: Austyn Parks  YOB: 1980  Date: 10/16/2024 Time: 8:27 AM  Location: [facility]    Leaving the Hospital Against Medical Advice    Chart #:[id number]    This will certify that I, the undersigned,    ______________________________________________________________________    A patient in the above named medical center, having requested discharge and removal from the medical center against the advice of my attending physician(s), hereby release [facility], its physicians, officers and employees, severally and individually, from any and all liability of any nature whatsoever for any injury or harm or complication of any kind that may result directly or indirectly, by reason of my terminating my stay as a patient at [facility] and my departure from [facility], and hereby waive any and all rights of action I may now have or later acquire as a result of my voluntary departure from [facility] and the termination of my stay as a patient therein.    This release is made with the full knowledge of the danger that may result from the action which I am taking.      Date:_______________________                         ___________________________                                                                                    Patient/Legal Representative    Witness:        ____________________________                          ___________________________  Nurse                                                                        Physician    
Spouse

## 2024-10-23 PROBLEM — Z00.00 ENCOUNTER FOR PREVENTIVE HEALTH EXAMINATION: Status: ACTIVE | Noted: 2024-10-23

## 2024-10-29 ENCOUNTER — APPOINTMENT (OUTPATIENT)
Dept: ORTHOPEDIC SURGERY | Facility: CLINIC | Age: 29
End: 2024-10-29

## 2024-10-29 DIAGNOSIS — M25.532 PAIN IN LEFT WRIST: ICD-10-CM

## 2025-05-23 NOTE — OB RN PATIENT PROFILE - FUNCTIONAL ASSESSMENT - DAILY ACTIVITY 4.
Requested Prescriptions     Pending Prescriptions Disp Refills    allopurinol (ZYLOPRIM) 300 MG tablet 90 tablet 1     Sig: Take 1 tablet by mouth daily    Next ov 07/18/2025. Pharmacy confirmed.   
4 = No assist / stand by assistance